# Patient Record
Sex: MALE | Race: OTHER | Employment: FULL TIME | URBAN - METROPOLITAN AREA
[De-identification: names, ages, dates, MRNs, and addresses within clinical notes are randomized per-mention and may not be internally consistent; named-entity substitution may affect disease eponyms.]

---

## 2024-05-30 ENCOUNTER — OFFICE VISIT (OUTPATIENT)
Age: 39
End: 2024-05-30

## 2024-05-30 VITALS
RESPIRATION RATE: 18 BRPM | HEART RATE: 84 BPM | DIASTOLIC BLOOD PRESSURE: 72 MMHG | HEIGHT: 71 IN | SYSTOLIC BLOOD PRESSURE: 114 MMHG | WEIGHT: 144.8 LBS | BODY MASS INDEX: 20.27 KG/M2 | TEMPERATURE: 98.6 F | OXYGEN SATURATION: 98 %

## 2024-05-30 DIAGNOSIS — H60.333 ACUTE SWIMMER'S EAR OF BOTH SIDES: Primary | ICD-10-CM

## 2024-05-30 RX ORDER — CIPROFLOXACIN AND DEXAMETHASONE 3; 1 MG/ML; MG/ML
4 SUSPENSION/ DROPS AURICULAR (OTIC) 2 TIMES DAILY
Qty: 7.5 ML | Refills: 0 | Status: SHIPPED | OUTPATIENT
Start: 2024-05-30 | End: 2024-06-06

## 2024-05-30 NOTE — PROGRESS NOTES
Gian Mello (:  1985) is a 39 y.o. male,New patient, here for evaluation of the following chief complaint(s):  New Patient (Left ear pain especially with sleep x 3 days. Head congestion with hard of hearing...sounds are muffled. )      Assessment & Plan :  Visit Diagnoses and Associated Orders       Acute swimmer's ear of both sides    -  Primary    ciprofloxacin-dexAMETHasone (CIPRODEX) 0.3-0.1 % otic suspension [97195]                    Ears flushed to remove copious debris. Post flush bilateral EACs macerated. To treat for acute OE with ciprodex ear drops twice daily x 7 days. To monitor symptoms and follow-up with ENT if symptoms worsen or do not improve. Follow up in PRN days if symptoms persist or if symptoms worsen.       Subjective :  HPI  HPI:   39 y.o. male presents with symptoms of Ear Pain  Patient complains of bilateral ear pain and possible ear infection. L is worse than R. Symptoms include bilateral ear pain and plugged sensation in both ears. Onset of symptoms was 3 days ago, unchanged since that time. He had leftover antibiotic neomycin and clotrimazole ear drops that he trialed without improvement. He also c/o  cold like symptoms a week ago, these have since resolved. He has a history of both bacterial and fungal OE, previously followed with ENT .  Denies recent swimming/water exposure. He is drinking plenty of fluids         Vitals:    24 1545   BP: 114/72   Site: Right Upper Arm   Position: Sitting   Cuff Size: Medium Adult   Pulse: 84   Resp: 18   Temp: 98.6 °F (37 °C)   SpO2: 98%   Weight: 65.7 kg (144 lb 12.8 oz)   Height: 1.803 m (5' 11\")      No Known Allergies  Social History     Socioeconomic History    Marital status:      Spouse name: Not on file    Number of children: Not on file    Years of education: Not on file    Highest education level: Not on file   Occupational History    Not on file   Tobacco Use    Smoking status: Never     Passive exposure: Never